# Patient Record
Sex: MALE | Race: WHITE
[De-identification: names, ages, dates, MRNs, and addresses within clinical notes are randomized per-mention and may not be internally consistent; named-entity substitution may affect disease eponyms.]

---

## 2019-08-03 ENCOUNTER — HOSPITAL ENCOUNTER (EMERGENCY)
Dept: HOSPITAL 8 - ED | Age: 49
Discharge: HOME | End: 2019-08-03
Payer: COMMERCIAL

## 2019-08-03 VITALS — BODY MASS INDEX: 40.4 KG/M2 | HEIGHT: 65 IN | WEIGHT: 242.51 LBS

## 2019-08-03 VITALS — DIASTOLIC BLOOD PRESSURE: 77 MMHG | SYSTOLIC BLOOD PRESSURE: 121 MMHG

## 2019-08-03 DIAGNOSIS — F41.1: ICD-10-CM

## 2019-08-03 DIAGNOSIS — F32.9: Primary | ICD-10-CM

## 2019-08-03 DIAGNOSIS — J45.909: ICD-10-CM

## 2019-08-03 DIAGNOSIS — F17.200: ICD-10-CM

## 2019-08-03 PROCEDURE — 99284 EMERGENCY DEPT VISIT MOD MDM: CPT

## 2019-08-03 NOTE — NUR
BIBA. REPORT RECEIVED FROM EMS. TRANSFFERED FROM alf. PT WAS SI LAST NIGHT D/T 
ETOH. DENIES SI/HI NOW. PT'S AOX4. RESPS EVEN AND UNLABORED. EDMD AT BEDSIDE TO 
EVALUATE NOW.

## 2023-05-02 ENCOUNTER — HOSPITAL ENCOUNTER (EMERGENCY)
Dept: HOSPITAL 92 - ERS | Age: 53
Discharge: HOME | End: 2023-05-02
Payer: SELF-PAY

## 2023-05-02 DIAGNOSIS — F17.210: ICD-10-CM

## 2023-05-02 DIAGNOSIS — J45.909: Primary | ICD-10-CM

## 2023-05-02 DIAGNOSIS — Z20.822: ICD-10-CM

## 2023-05-02 LAB
ALBUMIN SERPL BCG-MCNC: 4 G/DL (ref 3.5–5)
ALP SERPL-CCNC: 83 U/L (ref 40–110)
ALT SERPL W P-5'-P-CCNC: 20 U/L (ref 8–55)
ANION GAP SERPL CALC-SCNC: 14 MMOL/L (ref 10–20)
AST SERPL-CCNC: 17 U/L (ref 5–34)
BASOPHILS # BLD AUTO: 0.1 THOU/UL (ref 0–0.2)
BASOPHILS NFR BLD AUTO: 0.7 % (ref 0–1)
BILIRUB SERPL-MCNC: 0.5 MG/DL (ref 0.2–1.2)
BUN SERPL-MCNC: 10 MG/DL (ref 8.4–25.7)
CALCIUM SERPL-MCNC: 9.2 MG/DL (ref 7.8–10.44)
CHLORIDE SERPL-SCNC: 104 MMOL/L (ref 98–107)
CO2 SERPL-SCNC: 26 MMOL/L (ref 22–29)
CREAT CL PREDICTED SERPL C-G-VRATE: 0 ML/MIN (ref 70–130)
EOSINOPHIL # BLD AUTO: 0.2 THOU/UL (ref 0–0.7)
EOSINOPHIL NFR BLD AUTO: 2.7 % (ref 0–10)
GLOBULIN SER CALC-MCNC: 2.7 G/DL (ref 2.4–3.5)
GLUCOSE SERPL-MCNC: 98 MG/DL (ref 70–105)
HGB BLD-MCNC: 15.8 G/DL (ref 14–18)
LYMPHOCYTES # BLD: 2.6 THOU/UL (ref 1.2–3.4)
LYMPHOCYTES NFR BLD AUTO: 29.1 % (ref 21–51)
MCH RBC QN AUTO: 32.9 PG (ref 27–31)
MCV RBC AUTO: 93.7 FL (ref 78–98)
MONOCYTES # BLD AUTO: 0.8 THOU/UL (ref 0.11–0.59)
MONOCYTES NFR BLD AUTO: 8.8 % (ref 0–10)
NEUTROPHILS # BLD AUTO: 5.3 THOU/UL (ref 1.4–6.5)
NEUTROPHILS NFR BLD AUTO: 58.8 % (ref 42–75)
PLATELET # BLD AUTO: 212 10X3/UL (ref 130–400)
POTASSIUM SERPL-SCNC: 3.8 MMOL/L (ref 3.5–5.1)
RBC # BLD AUTO: 4.79 MILL/UL (ref 4.7–6.1)
SODIUM SERPL-SCNC: 140 MMOL/L (ref 136–145)
SP GR UR STRIP: 1.02 (ref 1–1.04)
WBC # BLD AUTO: 9 10X3/UL (ref 4.8–10.8)

## 2023-05-02 PROCEDURE — 80053 COMPREHEN METABOLIC PANEL: CPT

## 2023-05-02 PROCEDURE — 94640 AIRWAY INHALATION TREATMENT: CPT

## 2023-05-02 PROCEDURE — 85025 COMPLETE CBC W/AUTO DIFF WBC: CPT

## 2023-05-02 PROCEDURE — 71045 X-RAY EXAM CHEST 1 VIEW: CPT

## 2023-05-02 PROCEDURE — 81003 URINALYSIS AUTO W/O SCOPE: CPT

## 2023-05-02 PROCEDURE — 36415 COLL VENOUS BLD VENIPUNCTURE: CPT
